# Patient Record
Sex: MALE | URBAN - METROPOLITAN AREA
[De-identification: names, ages, dates, MRNs, and addresses within clinical notes are randomized per-mention and may not be internally consistent; named-entity substitution may affect disease eponyms.]

---

## 2019-05-24 ENCOUNTER — HOSPITAL ENCOUNTER (OUTPATIENT)
Age: 49
Setting detail: SPECIMEN
Discharge: HOME | End: 2019-05-24
Payer: COMMERCIAL

## 2019-06-03 LAB — SURGICAL PATHOLOGY REPORT: NORMAL

## 2024-04-08 RX ORDER — SODIUM CHLORIDE, SODIUM LACTATE, POTASSIUM CHLORIDE, CALCIUM CHLORIDE 600; 310; 30; 20 MG/100ML; MG/100ML; MG/100ML; MG/100ML
INJECTION, SOLUTION INTRAVENOUS CONTINUOUS
OUTPATIENT
Start: 2024-04-08

## 2024-04-08 NOTE — DISCHARGE INSTRUCTIONS
Preoperative Instructions:    Stop eating solid foods at midnight the night prior to surgery.    Stop drinking clear liquids at midnight the night prior to surgery.    Arrive at the surgery center (Entrance B) by 10:00 on 4/26/2024  (or as directed by your surgeon's office).    Please stop any blood thinning medications as directed by your surgeon or prescribing physician. Failure to stop certain medications may interfere with your scheduled surgery.    These may include:  Aspirin, Warfarin (Coumadin), Clopidogrel (Plavix), Ibuprofen (Motrin, Advil), Naproxen (Aleve), Meloxicam (Mobic), Celecoxib (Celebrex), Eliquis, Pradaxa, Xarelto, Effient, Fish Oil, Herbal supplements.  ?mesalamine? (Will Check with surgeon)    You may continue the rest of your medications through the night before surgery unless instructed otherwise.    Please take only the following medication(s) the day of surgery with a small sip of water:  Gabapentin/neurontin    No lisinopril AM of surgery.    Please use and bring inhalers the day of surgery.  Please bring CPAP the day of surgery.    PLEASE NOTE:  THE ABOVE (IF ANY) DISCONTINUED MEDS MAY ONLY BE FROM   \"CLEANING UP\" THE MED LIST AND WERE NOT ACTUALLY CANCELLED; SEE CHART FOR DETAILS AND ALWAYS CHECK WITH PRESCRIBING PROVIDER BEFORE DISCONTINUING ANY MEDICATIONS          REMINDERS:  ** If you are going home the day of your procedure, you will need a friend or family member to drive you home after your procedure.  Your  must be 18 years of age or older and able to sign off on your discharge instructions.  Taxi cabs or any form of public transportation is not acceptable.    ** It is preferable that the friend or family member stay at the hospital throughout your procedure.  ** If you are going home the same day as your procedure, someone must remain with you for the first 24 hours after your surgery if you receive anesthesia or sedation.  If you do not have someone to stay with you, your  powder.    Dress with clean freshly washed clothes.    The morning of surgery:    Repeat shower following steps above  - using remaining half of CHG soap in bottle.     If you have any questions, call the Pre-Admission Testing Unit at [phone].     Day of Surgery/Procedure    As a patient at [facility] you can expect quality medical and nursing care that is centered on your individual needs.  Our goal is to make your surgical experience as comfortable as possible  .  Directions to the Surgery Center    [facility] is located at [address] [place] [address].  Please pull into the Emergency Room & Surgery Center parking lot (Entrance B) and park in that lot.  We also have additional parking across the street.  You will enter the facility following the [facility] sign.  Please stop at the reception desk where you will be checked in by the staff.  If you have any questions please call [phone].    Transportation after your procedure.    You will need a friend or family member to drive you home after your procedure.  Your  must be 18 years of age or older and able to sign off on your discharge instructions.  Taxi cabs or any form of public transportation is not acceptable.  It is preferable that the friend or family member stay at the hospital throughout your procedure.  Someone must remain at home with you for the first 24 hours after your surgery if you receive anesthesia or sedation.  If you do not have someone to stay with you, your procedure may be cancelled.    Patient Instructions    If you are having any type of anesthesia you are to have nothing to eat or drink after midnight the night before your surgery.  This includes gum, mints, water or smoking or chewing tobacco.  The only exception to this is a small sip of water to take with any morning dose of heart, blood pressure, or seizure medications.    Bring a list of all medications you

## 2024-04-11 ENCOUNTER — HOSPITAL ENCOUNTER (OUTPATIENT)
Dept: PREADMISSION TESTING | Age: 54
Discharge: HOME | End: 2024-04-11
Payer: COMMERCIAL

## 2024-04-11 VITALS
RESPIRATION RATE: 20 BRPM | HEART RATE: 60 BPM | TEMPERATURE: 97.3 F | SYSTOLIC BLOOD PRESSURE: 139 MMHG | HEIGHT: 76 IN | OXYGEN SATURATION: 95 % | BODY MASS INDEX: 34.7 KG/M2 | DIASTOLIC BLOOD PRESSURE: 86 MMHG | WEIGHT: 285 LBS

## 2024-04-11 LAB
ANION GAP SERPL CALCULATED.3IONS-SCNC: 10 MMOL/L (ref 9–16)
BUN SERPL-MCNC: 22 MG/DL (ref 6–20)
CHLORIDE SERPL-SCNC: 104 MMOL/L (ref 98–107)
CO2 SERPL-SCNC: 26 MMOL/L (ref 20–31)
CREAT SERPL-MCNC: 0.8 MG/DL (ref 0.7–1.2)
ERYTHROCYTE [DISTWIDTH] IN BLOOD BY AUTOMATED COUNT: 12 % (ref 11.8–14.4)
GFR SERPL CREATININE-BSD FRML MDRD: >90 ML/MIN/1.73M2
GLUCOSE SERPL-MCNC: 99 MG/DL (ref 74–99)
HCT VFR BLD AUTO: 43.4 % (ref 40.7–50.3)
HGB BLD-MCNC: 14.6 G/DL (ref 13–17)
MCH RBC QN AUTO: 30.9 PG (ref 25.2–33.5)
MCHC RBC AUTO-ENTMCNC: 33.6 G/DL (ref 28.4–34.8)
MCV RBC AUTO: 91.8 FL (ref 82.6–102.9)
NRBC BLD-RTO: 0 PER 100 WBC
PLATELET # BLD AUTO: 299 K/UL (ref 138–453)
PMV BLD AUTO: 9.9 FL (ref 8.1–13.5)
POTASSIUM SERPL-SCNC: 4.2 MMOL/L (ref 3.7–5.3)
RBC # BLD AUTO: 4.73 M/UL (ref 4.21–5.77)
SODIUM SERPL-SCNC: 140 MMOL/L (ref 136–145)
WBC OTHER # BLD: 10.1 K/UL (ref 3.5–11.3)

## 2024-04-11 PROCEDURE — 84520 ASSAY OF UREA NITROGEN: CPT

## 2024-04-11 PROCEDURE — 82947 ASSAY GLUCOSE BLOOD QUANT: CPT

## 2024-04-11 PROCEDURE — 85027 COMPLETE CBC AUTOMATED: CPT

## 2024-04-11 PROCEDURE — 82565 ASSAY OF CREATININE: CPT

## 2024-04-11 PROCEDURE — 80051 ELECTROLYTE PANEL: CPT

## 2024-04-11 PROCEDURE — 36415 COLL VENOUS BLD VENIPUNCTURE: CPT

## 2024-04-11 PROCEDURE — 93005 ELECTROCARDIOGRAM TRACING: CPT | Performed by: STUDENT IN AN ORGANIZED HEALTH CARE EDUCATION/TRAINING PROGRAM

## 2024-04-11 RX ORDER — ACETAMINOPHEN 500 MG
500 TABLET ORAL EVERY 6 HOURS PRN
COMMUNITY

## 2024-04-11 RX ORDER — MONTELUKAST SODIUM 10 MG/1
10 TABLET ORAL NIGHTLY
COMMUNITY
Start: 2017-03-19

## 2024-04-11 RX ORDER — LISINOPRIL 10 MG/1
10 TABLET ORAL DAILY
COMMUNITY
Start: 2019-08-08

## 2024-04-11 RX ORDER — IBUPROFEN 200 MG
200 TABLET ORAL EVERY 6 HOURS PRN
COMMUNITY

## 2024-04-11 RX ORDER — MESALAMINE 1.2 G/1
1200 TABLET, DELAYED RELEASE ORAL
COMMUNITY
Start: 2022-05-12

## 2024-04-11 RX ORDER — FLUTICASONE PROPIONATE 50 MCG
2 SPRAY, SUSPENSION (ML) NASAL DAILY
COMMUNITY
Start: 2017-07-07

## 2024-04-11 RX ORDER — ALBUTEROL SULFATE 90 UG/1
1 AEROSOL, METERED RESPIRATORY (INHALATION) PRN
COMMUNITY
Start: 2017-02-10

## 2024-04-11 NOTE — PROGRESS NOTES
Anesthesia Focused Assessment      STOP-BANG Sleep Apnea Questionnaire    SNORE loudly (heard through closed doors)?   yes  TIRED, fatigued, sleepy during daytime?    no  OBSERVED stopping breathing during sleep?   yes  High blood PRESSURE being treated?    yes    BMI over 35?        Yes  AGE over 50?        No  NECK circumference over 16\"?     Yes  GENDER (male)?       No             Total 5  High risk 5-8  Intermediate risk 3-4  Low risk 0-2    Obstructive Sleep Apnea: yes  If YES, machine used: cpap     Type 1 DM:   no  T2DM:  no    Coronary Artery Disease:  no  Hypertension:  yes    Active smoker:  1.5 ppd for years, quit 2016  Drinks alcohol:  weekly  Recreational drugs: no    Dentition: benign    Defib / AICD / Pacemaker: no      Renal Failure/dialysis:  no    Patient was evaluated in PAT & anesthesia guidelines were applied.   NPO guidelines, medication instructions and scheduled arrival time were reviewed with patient.  I advised patient to please contact the surgeon's office, ahead of time if possible, if any new signs or symptoms of illness, infection, rash, etc    Hx of anesthesia complications:  no  Family hx of anesthesia complications:  no                                                                                                                     Patient is active, good exercise tolerance, no cardiac/pulmonary complaints.  Will forward all results to PCP Dr. Smith.  Contacted Dr. Alfaro's office regarding mesalamine, Annie says patient does not need to stop this preoperatively, patient informed.    MICHELLE ARROYO PA-C  4/11/24  1:00 PM

## 2024-04-11 NOTE — H&P
History and Physical    Pt Name: Ventura Victoria  MRN: [id number]  YOB: 1970  Date of evaluation: 4/11/2024    SUBJECTIVE:   History of Chief Complaint:    Patient presents for PAT appointment. He reports a several year history of lower back pain, RLE symptoms.  He says that symptoms were more manageable up until the past year, when symptoms increased in severity.  Patient says that he has not been able to be as active as he had been in the past due to this.  He has failed conservative treatment over time.  Currently he has lower back pain, RLE pain with numbness/tingling.  Patient has been scheduled for ANTERIOR LUMBAR INTERBODY FUSION L4-5, L5-S1.   Past Medical History    has a past medical history of Asthma, exercise induced, Former smoker, Hyperlipidemia, Hypertension, Sleep apnea, Ulcerative colitis (HCC), Under care of team, Under care of team, Wears eyeglasses, and Wellness examination.  Past Surgical History   has a past surgical history that includes nasal endoscopy (Bilateral, 2017); ORIF ankle fracture (Left, 1986); Knee cartilage surgery (Right, 2010); tumor removal; and Colonoscopy.  Medications  Prior to Admission medications    Medication Sig Start Date End Date Taking? Authorizing Provider   lisinopril (PRINIVIL;ZESTRIL) 10 MG tablet Take 1 tablet by mouth daily 8/8/19  Yes ProviderShereen MD   mesalamine (LIALDA) 1.2 g EC tablet Take 1 tablet by mouth daily (with breakfast)   Takes 4 tabs q am w/ breakfast 5/12/22  Yes ProviderShereen MD   montelukast (SINGULAIR) 10 MG tablet Take 1 tablet by mouth nightly 3/19/17  Yes Provider, MD Shereen   Misc Natural Products (TURMERIC CURCUMIN) CAPS Take by mouth Doesn't know dose. Takes 2 caps q am and 1 cap q HS   Yes ProviderShereen MD   acetaminophen (TYLENOL) 500 MG tablet Take 1 tablet by mouth every 6 hours as needed for Pain Takes 2 tab at 9 am and 4 pm   Yes ProviderShereen MD   ibuprofen (ADVIL;MOTRIN) 200 MG tablet  Take 1 tablet by mouth every 6 hours as needed for Pain Takes 2 tabs at 0430, 1200 and HS   Yes Shereen Mina MD   albuterol sulfate HFA (PROAIR HFA) 108 (90 Base) MCG/ACT inhaler Inhale 1 puff into the lungs as needed Last reported use 4/9/2024 for sob after walking 2/10/17  Yes Shereen Mina MD   fluticasone (FLONASE) 50 MCG/ACT nasal spray 2 sprays by Nasal route daily 7/7/17  Yes Shereen Mina MD   ROSUVASTATIN CALCIUM PO Take 10 mg by mouth daily (with breakfast)    Shereen Mina MD   GABAPENTIN PO Take 300 mg by mouth nightly    Shereen Mina MD     Allergies  is allergic to cats claw (uncaria tomentosa).  Family History  family history includes Breast Cancer in his mother; Heart Attack in his father; Heart Disease in his father.  Social History   reports that he has quit smoking. His smoking use included cigarettes. He has never used smokeless tobacco.   reports current alcohol use.   reports no history of drug use.  Marital Status has a long time girlfriend    Review of Systems:  CONSTITUTIONAL:   negative for fevers, chills, fatigue and malaise    EYES:   negative for double vision, blurred vision and photophobia    HEENT:   negative for tinnitus, epistaxis and sore throat     RESPIRATORY:   negative for cough, shortness of breath, wheezing     CARDIOVASCULAR:   negative for chest pain, palpitations, syncope, edema     GASTROINTESTINAL:   negative for nausea, vomiting     GENITOURINARY/RENAL:   negative for incontinence     MUSCULOSKELETAL:   See HPI   NEUROLOGICAL:   See HPI    PSYCHIATRIC:   negative for anxiety        OBJECTIVE:   VITALS:  height is 1.93 m (6' 4\") and weight is 129.3 kg (285 lb). His temporal temperature is 97.3 °F (36.3 °C). His blood pressure is 139/86 and his pulse is 60. His respiration is 20 and oxygen saturation is 95%.   CONSTITUTIONAL:alert & cooperative, no acute distress.  Very pleasant, talkative.  SKIN:  Brief inspection of skin, Warm

## 2024-04-12 LAB
EKG ATRIAL RATE: 58 BPM
EKG P AXIS: 37 DEGREES
EKG P-R INTERVAL: 182 MS
EKG Q-T INTERVAL: 406 MS
EKG QRS DURATION: 94 MS
EKG QTC CALCULATION (BAZETT): 398 MS
EKG R AXIS: 61 DEGREES
EKG T AXIS: 47 DEGREES
EKG VENTRICULAR RATE: 58 BPM

## 2024-04-12 PROCEDURE — 93010 ELECTROCARDIOGRAM REPORT: CPT | Performed by: INTERNAL MEDICINE

## 2024-04-26 ENCOUNTER — HOSPITAL ENCOUNTER (INPATIENT)
Age: 54
LOS: 2 days | Discharge: HOME | DRG: 460 | End: 2024-04-28
Attending: STUDENT IN AN ORGANIZED HEALTH CARE EDUCATION/TRAINING PROGRAM | Admitting: STUDENT IN AN ORGANIZED HEALTH CARE EDUCATION/TRAINING PROGRAM
Payer: COMMERCIAL

## 2024-04-26 ENCOUNTER — ANESTHESIA (OUTPATIENT)
Dept: OPERATING ROOM | Age: 54
End: 2024-04-26
Payer: COMMERCIAL

## 2024-04-26 ENCOUNTER — APPOINTMENT (OUTPATIENT)
Dept: GENERAL RADIOLOGY | Age: 54
DRG: 460 | End: 2024-04-26
Attending: STUDENT IN AN ORGANIZED HEALTH CARE EDUCATION/TRAINING PROGRAM
Payer: COMMERCIAL

## 2024-04-26 ENCOUNTER — ANESTHESIA EVENT (OUTPATIENT)
Dept: OPERATING ROOM | Age: 54
End: 2024-04-26
Payer: COMMERCIAL

## 2024-04-26 ENCOUNTER — HOSPITAL ENCOUNTER (OUTPATIENT)
Dept: CT IMAGING | Age: 54
Discharge: HOME | DRG: 460 | End: 2024-04-28
Attending: ORTHOPAEDIC SURGERY
Payer: COMMERCIAL

## 2024-04-26 DIAGNOSIS — G89.18 POST-OP PAIN: Primary | ICD-10-CM

## 2024-04-26 DIAGNOSIS — M48.062 PSEUDOCLAUDICATION SYNDROME: ICD-10-CM

## 2024-04-26 PROBLEM — Z98.890 STATUS POST LUMBAR SPINE OPERATION: Status: ACTIVE | Noted: 2024-04-26

## 2024-04-26 LAB
ABO + RH BLD: NORMAL
ARM BAND NUMBER: NORMAL
BASOPHILS # BLD: 0.08 K/UL (ref 0–0.2)
BASOPHILS NFR BLD: 1 % (ref 0–2)
BLOOD BANK SAMPLE EXPIRATION: NORMAL
BLOOD GROUP ANTIBODIES SERPL: NEGATIVE
EOSINOPHIL # BLD: 0.71 K/UL (ref 0–0.44)
EOSINOPHILS RELATIVE PERCENT: 6 % (ref 1–4)
ERYTHROCYTE [DISTWIDTH] IN BLOOD BY AUTOMATED COUNT: 11.8 % (ref 11.8–14.4)
HCT VFR BLD AUTO: 44.7 % (ref 40.7–50.3)
HCT VFR BLD AUTO: 50 % (ref 41–53)
HGB BLD-MCNC: 15.3 G/DL (ref 13–17)
IMM GRANULOCYTES # BLD AUTO: 0.11 K/UL (ref 0–0.3)
IMM GRANULOCYTES NFR BLD: 1 %
LYMPHOCYTES NFR BLD: 1.12 K/UL (ref 1.1–3.7)
LYMPHOCYTES RELATIVE PERCENT: 9 % (ref 24–43)
MCH RBC QN AUTO: 30.7 PG (ref 25.2–33.5)
MCHC RBC AUTO-ENTMCNC: 34.2 G/DL (ref 28.4–34.8)
MCV RBC AUTO: 89.6 FL (ref 82.6–102.9)
MONOCYTES NFR BLD: 1.37 K/UL (ref 0.1–1.2)
MONOCYTES NFR BLD: 11 % (ref 3–12)
NEUTROPHILS NFR BLD: 72 % (ref 36–65)
NEUTS SEG NFR BLD: 8.62 K/UL (ref 1.5–8.1)
NRBC BLD-RTO: 0 PER 100 WBC
PLATELET # BLD AUTO: 374 K/UL (ref 138–453)
PMV BLD AUTO: 9.1 FL (ref 8.1–13.5)
POC HEMOGLOBIN (CALC): 17 G/DL (ref 13.5–17.5)
RBC # BLD AUTO: 4.99 M/UL (ref 4.21–5.77)
WBC OTHER # BLD: 12 K/UL (ref 3.5–11.3)

## 2024-04-26 PROCEDURE — 85025 COMPLETE CBC W/AUTO DIFF WBC: CPT

## 2024-04-26 PROCEDURE — C1889 IMPLANT/INSERT DEVICE, NOC: HCPCS | Performed by: STUDENT IN AN ORGANIZED HEALTH CARE EDUCATION/TRAINING PROGRAM

## 2024-04-26 PROCEDURE — 7100000000 HC PACU RECOVERY - FIRST 15 MIN: Performed by: STUDENT IN AN ORGANIZED HEALTH CARE EDUCATION/TRAINING PROGRAM

## 2024-04-26 PROCEDURE — 86900 BLOOD TYPING SEROLOGIC ABO: CPT

## 2024-04-26 PROCEDURE — 2500000003 HC RX 250 WO HCPCS: Performed by: STUDENT IN AN ORGANIZED HEALTH CARE EDUCATION/TRAINING PROGRAM

## 2024-04-26 PROCEDURE — 1200000000 HC SEMI PRIVATE

## 2024-04-26 PROCEDURE — 0SG00A0 FUSION OF LUMBAR VERTEBRAL JOINT WITH INTERBODY FUSION DEVICE, ANTERIOR APPROACH, ANTERIOR COLUMN, OPEN APPROACH: ICD-10-PCS | Performed by: STUDENT IN AN ORGANIZED HEALTH CARE EDUCATION/TRAINING PROGRAM

## 2024-04-26 PROCEDURE — 85014 HEMATOCRIT: CPT

## 2024-04-26 PROCEDURE — 3700000000 HC ANESTHESIA ATTENDED CARE: Performed by: STUDENT IN AN ORGANIZED HEALTH CARE EDUCATION/TRAINING PROGRAM

## 2024-04-26 PROCEDURE — 2580000003 HC RX 258: Performed by: STUDENT IN AN ORGANIZED HEALTH CARE EDUCATION/TRAINING PROGRAM

## 2024-04-26 PROCEDURE — 6360000002 HC RX W HCPCS: Performed by: ANESTHESIOLOGY

## 2024-04-26 PROCEDURE — 72131 CT LUMBAR SPINE W/O DYE: CPT

## 2024-04-26 PROCEDURE — 2580000003 HC RX 258

## 2024-04-26 PROCEDURE — 3600000006 HC SURGERY LEVEL 6 BASE: Performed by: STUDENT IN AN ORGANIZED HEALTH CARE EDUCATION/TRAINING PROGRAM

## 2024-04-26 PROCEDURE — 6360000002 HC RX W HCPCS: Performed by: STUDENT IN AN ORGANIZED HEALTH CARE EDUCATION/TRAINING PROGRAM

## 2024-04-26 PROCEDURE — 0ST20ZZ RESECTION OF LUMBAR VERTEBRAL DISC, OPEN APPROACH: ICD-10-PCS | Performed by: STUDENT IN AN ORGANIZED HEALTH CARE EDUCATION/TRAINING PROGRAM

## 2024-04-26 PROCEDURE — 3600000016 HC SURGERY LEVEL 6 ADDTL 15MIN: Performed by: STUDENT IN AN ORGANIZED HEALTH CARE EDUCATION/TRAINING PROGRAM

## 2024-04-26 PROCEDURE — 86850 RBC ANTIBODY SCREEN: CPT

## 2024-04-26 PROCEDURE — 3E0U0GB INTRODUCTION OF RECOMBINANT BONE MORPHOGENETIC PROTEIN INTO JOINTS, OPEN APPROACH: ICD-10-PCS | Performed by: STUDENT IN AN ORGANIZED HEALTH CARE EDUCATION/TRAINING PROGRAM

## 2024-04-26 PROCEDURE — 2720000010 HC SURG SUPPLY STERILE: Performed by: STUDENT IN AN ORGANIZED HEALTH CARE EDUCATION/TRAINING PROGRAM

## 2024-04-26 PROCEDURE — 2060000002 HC BURN ICU INTERMEDIATE R&B

## 2024-04-26 PROCEDURE — 2500000003 HC RX 250 WO HCPCS: Performed by: SURGERY

## 2024-04-26 PROCEDURE — 6370000000 HC RX 637 (ALT 250 FOR IP)

## 2024-04-26 PROCEDURE — 86901 BLOOD TYPING SEROLOGIC RH(D): CPT

## 2024-04-26 PROCEDURE — 7100000001 HC PACU RECOVERY - ADDTL 15 MIN: Performed by: STUDENT IN AN ORGANIZED HEALTH CARE EDUCATION/TRAINING PROGRAM

## 2024-04-26 PROCEDURE — 6370000000 HC RX 637 (ALT 250 FOR IP): Performed by: STUDENT IN AN ORGANIZED HEALTH CARE EDUCATION/TRAINING PROGRAM

## 2024-04-26 PROCEDURE — 0SG30A0 FUSION OF LUMBOSACRAL JOINT WITH INTERBODY FUSION DEVICE, ANTERIOR APPROACH, ANTERIOR COLUMN, OPEN APPROACH: ICD-10-PCS | Performed by: STUDENT IN AN ORGANIZED HEALTH CARE EDUCATION/TRAINING PROGRAM

## 2024-04-26 PROCEDURE — 0ST40ZZ RESECTION OF LUMBOSACRAL DISC, OPEN APPROACH: ICD-10-PCS | Performed by: STUDENT IN AN ORGANIZED HEALTH CARE EDUCATION/TRAINING PROGRAM

## 2024-04-26 PROCEDURE — 8E0W3CZ ROBOTIC ASSISTED PROCEDURE OF TRUNK REGION, PERCUTANEOUS APPROACH: ICD-10-PCS | Performed by: STUDENT IN AN ORGANIZED HEALTH CARE EDUCATION/TRAINING PROGRAM

## 2024-04-26 PROCEDURE — 2500000003 HC RX 250 WO HCPCS

## 2024-04-26 PROCEDURE — 2709999900 HC NON-CHARGEABLE SUPPLY: Performed by: STUDENT IN AN ORGANIZED HEALTH CARE EDUCATION/TRAINING PROGRAM

## 2024-04-26 PROCEDURE — 36415 COLL VENOUS BLD VENIPUNCTURE: CPT

## 2024-04-26 PROCEDURE — 6360000002 HC RX W HCPCS

## 2024-04-26 PROCEDURE — C1713 ANCHOR/SCREW BN/BN,TIS/BN: HCPCS | Performed by: STUDENT IN AN ORGANIZED HEALTH CARE EDUCATION/TRAINING PROGRAM

## 2024-04-26 PROCEDURE — 3700000001 HC ADD 15 MINUTES (ANESTHESIA): Performed by: STUDENT IN AN ORGANIZED HEALTH CARE EDUCATION/TRAINING PROGRAM

## 2024-04-26 DEVICE — BONE SCREW 4675030 LS 5.0X30MM
Type: IMPLANTABLE DEVICE | Site: SPINE LUMBAR | Status: FUNCTIONAL
Brand: ANTERALIGN SPINAL SYSTEM WITH TITAN NANOLOCK SURFACE TECHNOLOGY

## 2024-04-26 DEVICE — SET SCR SPNL DIA4.75MM POST THORLUM SACR TI PERC APPRCH CDH: Type: IMPLANTABLE DEVICE | Site: SPINE LUMBAR | Status: FUNCTIONAL

## 2024-04-26 DEVICE — MAS 54850017555 4.75 EXT TAB CANN 7.5X55
Type: IMPLANTABLE DEVICE | Site: SPINE LUMBAR | Status: FUNCTIONAL
Brand: CD HORIZON® SOLERA® SPINAL SYSTEM

## 2024-04-26 DEVICE — BONE GRAFT KIT 7510100 INFUSE X SMALL
Type: IMPLANTABLE DEVICE | Site: SPINE LUMBAR | Status: FUNCTIONAL
Brand: INFUSE® BONE GRAFT

## 2024-04-26 DEVICE — DBM T43105 5CC GRAFTON PUTTY
Type: IMPLANTABLE DEVICE | Site: SPINE LUMBAR | Status: FUNCTIONAL
Brand: GRAFTON®AND GRAFTON PLUS®DEMINERALIZED BONE MATRIX (DBM)

## 2024-04-26 RX ORDER — MAGNESIUM HYDROXIDE 1200 MG/15ML
LIQUID ORAL CONTINUOUS PRN
Status: COMPLETED | OUTPATIENT
Start: 2024-04-26 | End: 2024-04-26

## 2024-04-26 RX ORDER — SODIUM CHLORIDE 0.9 % (FLUSH) 0.9 %
5-40 SYRINGE (ML) INJECTION PRN
Status: DISCONTINUED | OUTPATIENT
Start: 2024-04-26 | End: 2024-04-26 | Stop reason: HOSPADM

## 2024-04-26 RX ORDER — DEXAMETHASONE SODIUM PHOSPHATE 10 MG/ML
INJECTION INTRAMUSCULAR; INTRAVENOUS PRN
Status: DISCONTINUED | OUTPATIENT
Start: 2024-04-26 | End: 2024-04-26 | Stop reason: SDUPTHER

## 2024-04-26 RX ORDER — LISINOPRIL 10 MG/1
10 TABLET ORAL DAILY
Status: DISCONTINUED | OUTPATIENT
Start: 2024-04-26 | End: 2024-04-26

## 2024-04-26 RX ORDER — OXYCODONE HYDROCHLORIDE 5 MG/1
5 TABLET ORAL EVERY 4 HOURS PRN
Status: DISCONTINUED | OUTPATIENT
Start: 2024-04-26 | End: 2024-04-28 | Stop reason: HOSPADM

## 2024-04-26 RX ORDER — OXYCODONE HYDROCHLORIDE 5 MG/1
5 TABLET ORAL EVERY 6 HOURS PRN
Qty: 28 TABLET | Refills: 0 | Status: SHIPPED | OUTPATIENT
Start: 2024-04-26 | End: 2024-05-03

## 2024-04-26 RX ORDER — SODIUM CHLORIDE 9 MG/ML
INJECTION, SOLUTION INTRAVENOUS PRN
Status: DISCONTINUED | OUTPATIENT
Start: 2024-04-26 | End: 2024-04-26 | Stop reason: HOSPADM

## 2024-04-26 RX ORDER — ONDANSETRON 2 MG/ML
INJECTION INTRAMUSCULAR; INTRAVENOUS PRN
Status: DISCONTINUED | OUTPATIENT
Start: 2024-04-26 | End: 2024-04-26 | Stop reason: SDUPTHER

## 2024-04-26 RX ORDER — SODIUM CHLORIDE, SODIUM LACTATE, POTASSIUM CHLORIDE, CALCIUM CHLORIDE 600; 310; 30; 20 MG/100ML; MG/100ML; MG/100ML; MG/100ML
INJECTION, SOLUTION INTRAVENOUS CONTINUOUS PRN
Status: DISCONTINUED | OUTPATIENT
Start: 2024-04-26 | End: 2024-04-26 | Stop reason: SDUPTHER

## 2024-04-26 RX ORDER — ONDANSETRON 2 MG/ML
4 INJECTION INTRAMUSCULAR; INTRAVENOUS EVERY 6 HOURS PRN
Status: DISCONTINUED | OUTPATIENT
Start: 2024-04-26 | End: 2024-04-28 | Stop reason: HOSPADM

## 2024-04-26 RX ORDER — CYCLOBENZAPRINE HCL 10 MG
10 TABLET ORAL 3 TIMES DAILY PRN
Status: DISCONTINUED | OUTPATIENT
Start: 2024-04-26 | End: 2024-04-28 | Stop reason: HOSPADM

## 2024-04-26 RX ORDER — MONTELUKAST SODIUM 10 MG/1
10 TABLET ORAL NIGHTLY
Status: DISCONTINUED | OUTPATIENT
Start: 2024-04-26 | End: 2024-04-28 | Stop reason: HOSPADM

## 2024-04-26 RX ORDER — SENNA AND DOCUSATE SODIUM 50; 8.6 MG/1; MG/1
1 TABLET, FILM COATED ORAL 2 TIMES DAILY
Status: DISCONTINUED | OUTPATIENT
Start: 2024-04-26 | End: 2024-04-28 | Stop reason: HOSPADM

## 2024-04-26 RX ORDER — DOCUSATE SODIUM 100 MG/1
100 CAPSULE, LIQUID FILLED ORAL 2 TIMES DAILY
Qty: 20 CAPSULE | Refills: 1 | Status: SHIPPED | OUTPATIENT
Start: 2024-04-26

## 2024-04-26 RX ORDER — PROPOFOL 10 MG/ML
INJECTION, EMULSION INTRAVENOUS PRN
Status: DISCONTINUED | OUTPATIENT
Start: 2024-04-26 | End: 2024-04-26 | Stop reason: SDUPTHER

## 2024-04-26 RX ORDER — ROSUVASTATIN CALCIUM 10 MG/1
10 TABLET, COATED ORAL
Status: DISCONTINUED | OUTPATIENT
Start: 2024-04-27 | End: 2024-04-28 | Stop reason: HOSPADM

## 2024-04-26 RX ORDER — MESALAMINE 400 MG/1
400 CAPSULE, DELAYED RELEASE ORAL 3 TIMES DAILY
Status: DISCONTINUED | OUTPATIENT
Start: 2024-04-27 | End: 2024-04-28 | Stop reason: HOSPADM

## 2024-04-26 RX ORDER — ACETAMINOPHEN 325 MG/1
650 TABLET ORAL EVERY 6 HOURS
Status: DISCONTINUED | OUTPATIENT
Start: 2024-04-26 | End: 2024-04-28 | Stop reason: HOSPADM

## 2024-04-26 RX ORDER — LISINOPRIL 10 MG/1
10 TABLET ORAL DAILY
Status: DISCONTINUED | OUTPATIENT
Start: 2024-04-27 | End: 2024-04-28 | Stop reason: HOSPADM

## 2024-04-26 RX ORDER — LIDOCAINE HYDROCHLORIDE 10 MG/ML
INJECTION, SOLUTION EPIDURAL; INFILTRATION; INTRACAUDAL; PERINEURAL PRN
Status: DISCONTINUED | OUTPATIENT
Start: 2024-04-26 | End: 2024-04-26 | Stop reason: SDUPTHER

## 2024-04-26 RX ORDER — ALBUTEROL SULFATE 90 UG/1
1 AEROSOL, METERED RESPIRATORY (INHALATION) PRN
Status: DISCONTINUED | OUTPATIENT
Start: 2024-04-26 | End: 2024-04-28 | Stop reason: HOSPADM

## 2024-04-26 RX ORDER — FLUTICASONE PROPIONATE 50 MCG
2 SPRAY, SUSPENSION (ML) NASAL DAILY
Status: DISCONTINUED | OUTPATIENT
Start: 2024-04-26 | End: 2024-04-28 | Stop reason: HOSPADM

## 2024-04-26 RX ORDER — SODIUM CHLORIDE 9 MG/ML
INJECTION, SOLUTION INTRAVENOUS CONTINUOUS
Status: DISCONTINUED | OUTPATIENT
Start: 2024-04-26 | End: 2024-04-28

## 2024-04-26 RX ORDER — SODIUM CHLORIDE 9 MG/ML
INJECTION, SOLUTION INTRAVENOUS CONTINUOUS PRN
Status: DISCONTINUED | OUTPATIENT
Start: 2024-04-26 | End: 2024-04-26 | Stop reason: SDUPTHER

## 2024-04-26 RX ORDER — NALOXONE HYDROCHLORIDE 0.4 MG/ML
INJECTION, SOLUTION INTRAMUSCULAR; INTRAVENOUS; SUBCUTANEOUS PRN
Status: DISCONTINUED | OUTPATIENT
Start: 2024-04-26 | End: 2024-04-26 | Stop reason: HOSPADM

## 2024-04-26 RX ORDER — FENTANYL CITRATE 50 UG/ML
INJECTION, SOLUTION INTRAMUSCULAR; INTRAVENOUS PRN
Status: DISCONTINUED | OUTPATIENT
Start: 2024-04-26 | End: 2024-04-26 | Stop reason: SDUPTHER

## 2024-04-26 RX ORDER — SODIUM CHLORIDE 0.9 % (FLUSH) 0.9 %
5-40 SYRINGE (ML) INJECTION EVERY 12 HOURS SCHEDULED
Status: DISCONTINUED | OUTPATIENT
Start: 2024-04-26 | End: 2024-04-28 | Stop reason: HOSPADM

## 2024-04-26 RX ORDER — SODIUM CHLORIDE, SODIUM LACTATE, POTASSIUM CHLORIDE, CALCIUM CHLORIDE 600; 310; 30; 20 MG/100ML; MG/100ML; MG/100ML; MG/100ML
INJECTION, SOLUTION INTRAVENOUS CONTINUOUS
Status: DISCONTINUED | OUTPATIENT
Start: 2024-04-26 | End: 2024-04-26 | Stop reason: HOSPADM

## 2024-04-26 RX ORDER — HEPARIN SODIUM 10000 [USP'U]/ML
INJECTION, SOLUTION INTRAVENOUS; SUBCUTANEOUS PRN
Status: DISCONTINUED | OUTPATIENT
Start: 2024-04-26 | End: 2024-04-26 | Stop reason: ALTCHOICE

## 2024-04-26 RX ORDER — MIDAZOLAM HYDROCHLORIDE 1 MG/ML
INJECTION INTRAMUSCULAR; INTRAVENOUS PRN
Status: DISCONTINUED | OUTPATIENT
Start: 2024-04-26 | End: 2024-04-26 | Stop reason: SDUPTHER

## 2024-04-26 RX ORDER — SODIUM CHLORIDE 0.9 % (FLUSH) 0.9 %
5-40 SYRINGE (ML) INJECTION PRN
Status: DISCONTINUED | OUTPATIENT
Start: 2024-04-26 | End: 2024-04-28 | Stop reason: HOSPADM

## 2024-04-26 RX ORDER — KETAMINE HYDROCHLORIDE 100 MG/ML
INJECTION, SOLUTION INTRAMUSCULAR; INTRAVENOUS PRN
Status: DISCONTINUED | OUTPATIENT
Start: 2024-04-26 | End: 2024-04-26 | Stop reason: SDUPTHER

## 2024-04-26 RX ORDER — ROCURONIUM BROMIDE 10 MG/ML
INJECTION, SOLUTION INTRAVENOUS PRN
Status: DISCONTINUED | OUTPATIENT
Start: 2024-04-26 | End: 2024-04-26 | Stop reason: SDUPTHER

## 2024-04-26 RX ORDER — CYCLOBENZAPRINE HCL 10 MG
10 TABLET ORAL
Qty: 50 TABLET | Refills: 0 | Status: SHIPPED | OUTPATIENT
Start: 2024-04-26

## 2024-04-26 RX ORDER — SODIUM CHLORIDE 0.9 % (FLUSH) 0.9 %
5-40 SYRINGE (ML) INJECTION EVERY 12 HOURS SCHEDULED
Status: DISCONTINUED | OUTPATIENT
Start: 2024-04-26 | End: 2024-04-26 | Stop reason: HOSPADM

## 2024-04-26 RX ORDER — SODIUM CHLORIDE 9 MG/ML
INJECTION, SOLUTION INTRAVENOUS PRN
Status: DISCONTINUED | OUTPATIENT
Start: 2024-04-26 | End: 2024-04-28 | Stop reason: HOSPADM

## 2024-04-26 RX ORDER — MESALAMINE 400 MG/1
1600 CAPSULE, DELAYED RELEASE ORAL 3 TIMES DAILY
Status: DISCONTINUED | OUTPATIENT
Start: 2024-04-26 | End: 2024-04-26

## 2024-04-26 RX ORDER — OXYCODONE HYDROCHLORIDE 5 MG/1
10 TABLET ORAL EVERY 4 HOURS PRN
Status: DISCONTINUED | OUTPATIENT
Start: 2024-04-26 | End: 2024-04-28 | Stop reason: HOSPADM

## 2024-04-26 RX ADMIN — DEXAMETHASONE SODIUM PHOSPHATE 10 MG: 10 INJECTION INTRAMUSCULAR; INTRAVENOUS at 15:00

## 2024-04-26 RX ADMIN — ACETAMINOPHEN 650 MG: 325 TABLET ORAL at 19:54

## 2024-04-26 RX ADMIN — MIDAZOLAM 2 MG: 1 INJECTION INTRAMUSCULAR; INTRAVENOUS at 13:28

## 2024-04-26 RX ADMIN — OXYCODONE 10 MG: 5 TABLET ORAL at 19:54

## 2024-04-26 RX ADMIN — SUGAMMADEX 400 MG: 100 INJECTION, SOLUTION INTRAVENOUS at 18:47

## 2024-04-26 RX ADMIN — ONDANSETRON 4 MG: 2 INJECTION INTRAMUSCULAR; INTRAVENOUS at 15:00

## 2024-04-26 RX ADMIN — Medication 2000 MG: at 17:35

## 2024-04-26 RX ADMIN — SODIUM CHLORIDE, SODIUM LACTATE, POTASSIUM CHLORIDE, CALCIUM CHLORIDE: 600; 310; 30; 20 INJECTION, SOLUTION INTRAVENOUS at 17:03

## 2024-04-26 RX ADMIN — MONTELUKAST SODIUM 10 MG: 10 TABLET, FILM COATED ORAL at 21:24

## 2024-04-26 RX ADMIN — SODIUM CHLORIDE: 9 INJECTION, SOLUTION INTRAVENOUS at 20:22

## 2024-04-26 RX ADMIN — SODIUM CHLORIDE: 9 INJECTION, SOLUTION INTRAVENOUS at 15:00

## 2024-04-26 RX ADMIN — HYDROMORPHONE HYDROCHLORIDE 0.5 MG: 1 INJECTION, SOLUTION INTRAMUSCULAR; INTRAVENOUS; SUBCUTANEOUS at 19:04

## 2024-04-26 RX ADMIN — FENTANYL CITRATE 100 MCG: 50 INJECTION, SOLUTION INTRAMUSCULAR; INTRAVENOUS at 13:45

## 2024-04-26 RX ADMIN — SODIUM CHLORIDE, POTASSIUM CHLORIDE, SODIUM LACTATE AND CALCIUM CHLORIDE: 600; 310; 30; 20 INJECTION, SOLUTION INTRAVENOUS at 13:15

## 2024-04-26 RX ADMIN — FENTANYL CITRATE 50 MCG: 50 INJECTION, SOLUTION INTRAMUSCULAR; INTRAVENOUS at 18:09

## 2024-04-26 RX ADMIN — FENTANYL CITRATE 50 MCG: 50 INJECTION, SOLUTION INTRAMUSCULAR; INTRAVENOUS at 17:42

## 2024-04-26 RX ADMIN — LIDOCAINE HYDROCHLORIDE 50 MG: 10 INJECTION, SOLUTION EPIDURAL; INFILTRATION; INTRACAUDAL; PERINEURAL at 13:28

## 2024-04-26 RX ADMIN — ROCURONIUM BROMIDE 20 MG: 10 INJECTION INTRAVENOUS at 15:29

## 2024-04-26 RX ADMIN — FENTANYL CITRATE 100 MCG: 50 INJECTION, SOLUTION INTRAMUSCULAR; INTRAVENOUS at 18:24

## 2024-04-26 RX ADMIN — KETAMINE HYDROCHLORIDE 20 MG: 100 INJECTION, SOLUTION, CONCENTRATE INTRAMUSCULAR; INTRAVENOUS at 15:29

## 2024-04-26 RX ADMIN — FENTANYL CITRATE 100 MCG: 50 INJECTION, SOLUTION INTRAMUSCULAR; INTRAVENOUS at 13:28

## 2024-04-26 RX ADMIN — FENTANYL CITRATE 50 MCG: 50 INJECTION, SOLUTION INTRAMUSCULAR; INTRAVENOUS at 15:29

## 2024-04-26 RX ADMIN — ROCURONIUM BROMIDE 50 MG: 10 INJECTION INTRAVENOUS at 14:38

## 2024-04-26 RX ADMIN — HYDROMORPHONE HYDROCHLORIDE 0.5 MG: 1 INJECTION, SOLUTION INTRAMUSCULAR; INTRAVENOUS; SUBCUTANEOUS at 19:15

## 2024-04-26 RX ADMIN — KETAMINE HYDROCHLORIDE 20 MG: 100 INJECTION, SOLUTION, CONCENTRATE INTRAMUSCULAR; INTRAVENOUS at 15:00

## 2024-04-26 RX ADMIN — SODIUM CHLORIDE, PRESERVATIVE FREE 10 ML: 5 INJECTION INTRAVENOUS at 20:23

## 2024-04-26 RX ADMIN — PROPOFOL 200 MG: 10 INJECTION, EMULSION INTRAVENOUS at 13:28

## 2024-04-26 RX ADMIN — ROCURONIUM BROMIDE 50 MG: 10 INJECTION INTRAVENOUS at 13:28

## 2024-04-26 RX ADMIN — Medication 3000 MG: at 13:40

## 2024-04-26 RX ADMIN — ROCURONIUM BROMIDE 10 MG: 10 INJECTION INTRAVENOUS at 17:45

## 2024-04-26 RX ADMIN — ROCURONIUM BROMIDE 50 MG: 10 INJECTION INTRAVENOUS at 13:45

## 2024-04-26 RX ADMIN — KETAMINE HYDROCHLORIDE 10 MG: 100 INJECTION, SOLUTION, CONCENTRATE INTRAMUSCULAR; INTRAVENOUS at 14:09

## 2024-04-26 ASSESSMENT — PAIN DESCRIPTION - ONSET: ONSET: ON-GOING

## 2024-04-26 ASSESSMENT — PAIN SCALES - GENERAL
PAINLEVEL_OUTOF10: 7
PAINLEVEL_OUTOF10: 8
PAINLEVEL_OUTOF10: 8

## 2024-04-26 ASSESSMENT — PAIN DESCRIPTION - LOCATION: LOCATION: BACK

## 2024-04-26 ASSESSMENT — PAIN DESCRIPTION - PAIN TYPE: TYPE: SURGICAL PAIN

## 2024-04-26 ASSESSMENT — PAIN - FUNCTIONAL ASSESSMENT: PAIN_FUNCTIONAL_ASSESSMENT: ACTIVITIES ARE NOT PREVENTED

## 2024-04-26 ASSESSMENT — PAIN DESCRIPTION - ORIENTATION: ORIENTATION: MID

## 2024-04-26 ASSESSMENT — PAIN DESCRIPTION - DESCRIPTORS: DESCRIPTORS: SORE

## 2024-04-26 ASSESSMENT — PAIN DESCRIPTION - FREQUENCY: FREQUENCY: CONTINUOUS

## 2024-04-26 NOTE — BRIEF OP NOTE
5X30 MM TITAN NANOLOCK STRL ANTERALIGN LTX - [id number]  SCREW SPNL LCK 5X30 MM TITAN NANOLOCK STRL ANTERALIGN LTX  [employer] SOFAMOR DANEK-WD [id number] N/A 1 Implanted   SCREW SPNL LCK 5X30 MM TITAN NANOLOCK STRL ANTERALIGN LTX - [id number]  SCREW SPNL LCK 5X30 MM TITAN NANOLOCK STRL ANTERALIGN LTX  [employer] SOFAMOR DANEK-WD [id number] N/A 1 Implanted   [employer] ANTERALIGN SPINAL SYSTEM REF: [id number]     [id number] N/A 1 Implanted   SCREW SPNL LCK 5X20 MM TITAN NANOLOCK STRL ANTERALIGN LTX - [id number]  SCREW SPNL LCK 5X20 MM TITAN NANOLOCK STRL ANTERALIGN LTX  [employer] SOFAMOR DANEK-WD  N/A 1 Implanted   SCREW SPNL LCK 5X30 MM TITAN NANOLOCK STRL ANTERALIGN LTX - [id number]  SCREW SPNL LCK 5X30 MM TITAN NANOLOCK STRL ANTERALIGN LTX  [employer] SOFAMOR DANEK-WD  N/A 4 Implanted   SCREW SPNL L55MM OD65MM POST THORACOLUMBOSACRAL RAJESH - [id number]  SCREW SPNL L55MM OD65MM POST THORACOLUMBOSACRAL RAJESH  [employer] SOFAMOR DANEK-WD  N/A 3 Implanted   SCREW SPNL L55MM OD75MM POST THORACOLUMBOSACRAL RAJESH - [id number]  SCREW SPNL L55MM OD75MM POST THORACOLUMBOSACRAL RAJESH  [employer] SOFAMOR DANEK-WD  N/A 2 Implanted   SCREW SPNL RAJESH 6.5X40 MM POST TI COCR CD HORZ SOLERA DISP - [id number]  SCREW SPNL RAJESH 6.5X40 MM POST TI COCR CD HORZ SOLERA DISP  [employer] SOFAMOR DANEK-WD  N/A 1 Implanted   TERESSA SPNL L50MM IS660LF CO CHROME MOLYBDENUM POST - [id number]  TERESSA SPNL L50MM [id number] CO CHROME MOLYBDENUM POST  [employer] SOFAMOR DANEK-WD  N/A 2 Implanted   SET SCR SPNL DIA4.75MM POST THORLUM SACR TI PERC APPRCH CDH - [id number]  SET SCR SPNL DIA4.75MM POST THORLUM SACR TI PERC APPRCH CDH  [employer] SOFAMOR DANEK-WD  N/A 6 Implanted   LS SPACER; MD CALLAWAY  [id number]; [employer]; REF: [id number]   [id number]   N/A 1 Implanted         Drains:   [REMOVED] Urinary Catheter 04/26/24 Zapata (Removed)       Findings:  Infection Present At Time Of Surgery (PATOS) (choose all levels that have infection present):  No infection present  Other Findings:

## 2024-04-26 NOTE — DISCHARGE INSTRUCTIONS
Orthopedic Spine Discharge Instructions:  -Mobilize as tolerated. Avoid excessive Bending, Lifting, and Twisting (BLT).  -Maintain surgical dressing in place until follow-up if possible. May shower in 48 hours, let water run over dressing, but do not scrub. If dressing comes off with activity and hygiene, may leave uncovered if no drainage. Otherwise may replace with simple gauze and tape dressing.  -Ice (20 minutes on and off 1 hour) as needed for swelling/pain.  -Drink plenty of fluids.  -Call the office or come to Emergency Room if signs of infection appear (hot, swollen, red, draining pus, fever).  -Take medications as prescribed.  -Wean off narcotics (Percocet/Norco) as soon as possible. Do not take Tylenol if still taking narcotics.  -No alcoholic beverages or driving/operating machinery while on narcotics  -Follow up with Dr. Alfaro in his office in 10-14 days after surgery/discharge. Call [phone] to schedule.

## 2024-04-26 NOTE — ANESTHESIA PRE PROCEDURE
Department of Anesthesiology  Preprocedure Note       Name:  Ventura Victoria   Age:  54 y.o.  :  1970                                          MRN:  [id number]         Date:  2024      Surgeon: Surgeon(s):  Arsh Alfaro, Arsh Sandoval, Cliff Butcher DO    Procedure: Procedure(s):  ANTERIOR LUMBAR INTERBODY FUSION L4-5, L5-S1  MAZOR ROBOTIC ASSISTED PERCUTANEOUS POSTERIOR INSTRUMENTATION L4-L5, L5-S1  ([employer], SILVIA TABLE, C-ARM, *CELL-SAVER*)  ABDOMINAL APPROACH    Medications prior to admission:   Prior to Admission medications    Medication Sig Start Date End Date Taking? Authorizing Provider   lisinopril (PRINIVIL;ZESTRIL) 10 MG tablet Take 1 tablet by mouth daily 19   Shereen Mina MD   ROSUVASTATIN CALCIUM PO Take 10 mg by mouth daily (with breakfast)    Shereen Mina MD   mesalamine (LIALDA) 1.2 g EC tablet Take 1 tablet by mouth daily (with breakfast)   Takes 4 tabs q am w/ breakfast 22   Shereen Mina MD   GABAPENTIN PO Take 300 mg by mouth nightly    Shereen Mina MD   montelukast (SINGULAIR) 10 MG tablet Take 1 tablet by mouth nightly 3/19/17   Sehreen Mina MD   Misc Natural Products (TURMERIC CURCUMIN) CAPS Take by mouth Doesn't know dose. Takes 2 caps q am and 1 cap q HS    Shereen Mina MD   acetaminophen (TYLENOL) 500 MG tablet Take 1 tablet by mouth every 6 hours as needed for Pain Takes 2 tab at 9 am and 4 pm    Shereen Mina MD   ibuprofen (ADVIL;MOTRIN) 200 MG tablet Take 1 tablet by mouth every 6 hours as needed for Pain Takes 2 tabs at 0430, 1200 and HS    Shereen Mina MD   albuterol sulfate HFA (PROAIR HFA) 108 (90 Base) MCG/ACT inhaler Inhale 1 puff into the lungs as needed Last reported use 2024 for sob after walking 2/10/17   Shereen Mina MD   fluticasone (FLONASE) 50 MCG/ACT nasal spray 2 sprays by Nasal route daily 17   Shereen Mina MD       Current

## 2024-04-26 NOTE — ANESTHESIA PROCEDURE NOTES
Airway  Date/Time: 4/26/2024 1:32 PM  Urgency: elective    Airway not difficult    General Information and Staff    Patient location during procedure: OR  Resident/CRNA: Navarro Diop MD  Performed: resident/CRNA   Performed by: Navarro Diop MD  Authorized by: Rohini Barragan MD      Indications and Patient Condition  Indications for airway management: anesthesia  Preoxygenated: yes  Patient position: sniffing  Mask difficulty assessment: difficult bag mask (inadequate, unstable or two providers) +/- NMBA    Final Airway Details  Final airway type: endotracheal airway      Successful airway: ETT  Cuffed: yes   Successful intubation technique: video laryngoscopy  Endotracheal tube insertion site: oral  Blade: Mae  Blade size: #4  ETT size (mm): 7.5  Cormack-Lehane Classification: grade I - full view of glottis  Placement verified by: chest auscultation and capnometry   Measured from: teeth  ETT to teeth (cm): 24  Number of attempts at approach: 1    no

## 2024-04-26 NOTE — H&P
Pt Name: Ventura Victoria  MRN: [id number]  YOB: 1970  Date of evaluation: 4/26/2024    I have reviewed the patient's history and physical examination completed in pre-admission testing on 4/11/24. Original note copied and pasted below.     No changes to history or on examination today, unless noted below.   He reports history of UC and that he had recent stool testing per his PCP for diarrhea and that he has been cleared  by PCP thereafter. No other changes.    LAURA LUTZ, APRN - CNP  4/26/24  11:41 AM    History and Physical    Pt Name: Ventura Victoria  MRN: [id number]  YOB: 1970  Date of evaluation: 4/11/2024    SUBJECTIVE:   History of Chief Complaint:    Patient presents for PAT appointment. He reports a several year history of lower back pain, RLE symptoms.  He says that symptoms were more manageable up until the past year, when symptoms increased in severity.  Patient says that he has not been able to be as active as he had been in the past due to this.  He has failed conservative treatment over time.  Currently he has lower back pain, RLE pain with numbness/tingling.  Patient has been scheduled for ANTERIOR LUMBAR INTERBODY FUSION L4-5, L5-S1.   Past Medical History    has a past medical history of Asthma, exercise induced, Former smoker, Hyperlipidemia, Hypertension, Sleep apnea, Ulcerative colitis (HCC), Under care of team, Under care of team, Wears eyeglasses, and Wellness examination.  Past Surgical History   has a past surgical history that includes nasal endoscopy (Bilateral, 2017); ORIF ankle fracture (Left, 1986); Knee cartilage surgery (Right, 2010); tumor removal; and Colonoscopy.  Medications  Prior to Admission medications    Medication Sig Start Date End Date Taking? Authorizing Provider   lisinopril (PRINIVIL;ZESTRIL) 10 MG tablet Take 1 tablet by mouth daily 8/8/19  Yes Provider, MD Shereen   mesalamine (LIALDA) 1.2 g EC tablet Take 1 tablet by mouth daily (with breakfast)

## 2024-04-26 NOTE — ANESTHESIA POSTPROCEDURE EVALUATION
Department of Anesthesiology  Postprocedure Note    Patient: Ventura Victoria  MRN: [id number]  YOB: 1970  Date of evaluation: 4/26/2024    Procedure Summary       Date: 04/26/24 Room / Location: [address]    Anesthesia Start: 1315 Anesthesia Stop: 1902    Procedures:       ANTERIOR LUMBAR INTERBODY FUSION L4-5, L5-S1      MAZOR ROBOTIC ASSISTED PERCUTANEOUS POSTERIOR INSTRUMENTATION L4-L5, L5-S1      ABDOMINAL APPROACH Diagnosis:       Spinal stenosis, unspecified spinal region      Lumbar spine instability      (Spinal stenosis, unspecified spinal region [M48.00])      (Lumbar spine instability [M53.2X6])    Surgeons: Asrh Alfaro DO; Cliff Alvarenga DO Responsible Provider: Ramírez Dooley MD    Anesthesia Type: general ASA Status: 3            Anesthesia Type: No value filed.    Lotus Phase I:      Lotus Phase II:      Anesthesia Post Evaluation    Patient location during evaluation: PACU  Patient participation: complete - patient participated  Level of consciousness: awake  Pain score: 1  Airway patency: patent  Nausea & Vomiting: no nausea and no vomiting  Cardiovascular status: blood pressure returned to baseline and hemodynamically stable  Respiratory status: acceptable  Hydration status: euvolemic  Pain management: adequate    No notable events documented.

## 2024-04-26 NOTE — PLAN OF CARE
Patient:  Ventura Victoria  YOB: 1970     54 y.o. male    Orthopedic post-operative instructions    Ventura Victoria is a 54 y.o. male who is being seen for:    - Lumbar spondylolisthesis s/p ALIF L4-S1 with posterior instrumentation, POD#0    - No further orthopaedic surgical intervention planned at this time   - Soft dressings on abdomen, lumbar spine . Please maintain and keep clean and dry. Reinforce dressings as needed per nursing.  - AAT. Avoid excessive bending, twisting, lifting.   - Complete post-op antibiotics: Ancef 2g q8h x2 doses  - Diet: Ok for Adult diet from ortho perspective   - DVT ppx: OK for chemical AC POD3-5   - PT/OT evaluation post-op.  - Pain control and medical management per primary  -IM consulted for assistance with medical management and home medication reorder.  - Ice for pain and swelling   - Follow up with Dr. Alfaro in 14 days.  -Please contact Ortho on call with any questions    Tapan Sy DO  Orthopedic Surgery Resident, PGY-1  [place], [place]

## 2024-04-26 NOTE — ANESTHESIA PROCEDURE NOTES
Arterial Line:    An arterial line was placed using ultrasound guidance and surface landmarks, in the OR for the following indication(s): continuous blood pressure monitoring and blood sampling needed.    A  (size), 1 and 3/4 inch (length), Angiocath (type) catheter was placed, Seldinger technique used, into the left radial artery, secured by tape and Tegaderm.  Anesthesia type: General    Events:  patient tolerated procedure well with no complications.4/26/2024 12:35 PM4/26/2024 12:40 PM  Resident/CRNA: Navarro Diop MD  Performed: Resident/CRNA   Preanesthetic Checklist  Completed: patient identified, IV checked, site marked, risks and benefits discussed, surgical/procedural consents, equipment checked, pre-op evaluation, timeout performed, anesthesia consent given, oxygen available, monitors applied/VS acknowledged, fire risk safety assessment completed and verbalized and blood product R/B/A discussed and consented

## 2024-04-27 LAB
ANION GAP SERPL CALCULATED.3IONS-SCNC: 11 MMOL/L (ref 9–16)
ANION GAP SERPL CALCULATED.3IONS-SCNC: 13 MMOL/L (ref 9–16)
BUN SERPL-MCNC: 21 MG/DL (ref 6–20)
BUN SERPL-MCNC: 24 MG/DL (ref 6–20)
CALCIUM SERPL-MCNC: 7.9 MG/DL (ref 8.6–10.4)
CALCIUM SERPL-MCNC: 8.1 MG/DL (ref 8.6–10.4)
CHLORIDE SERPL-SCNC: 102 MMOL/L (ref 98–107)
CHLORIDE SERPL-SCNC: 103 MMOL/L (ref 98–107)
CO2 SERPL-SCNC: 19 MMOL/L (ref 20–31)
CO2 SERPL-SCNC: 19 MMOL/L (ref 20–31)
CREAT SERPL-MCNC: 0.8 MG/DL (ref 0.7–1.2)
CREAT SERPL-MCNC: 0.9 MG/DL (ref 0.7–1.2)
ERYTHROCYTE [DISTWIDTH] IN BLOOD BY AUTOMATED COUNT: 11.9 % (ref 11.8–14.4)
GFR SERPL CREATININE-BSD FRML MDRD: >90 ML/MIN/1.73M2
GFR SERPL CREATININE-BSD FRML MDRD: >90 ML/MIN/1.73M2
GLUCOSE SERPL-MCNC: 136 MG/DL (ref 74–99)
GLUCOSE SERPL-MCNC: 146 MG/DL (ref 74–99)
HCT VFR BLD AUTO: 37.2 % (ref 40.7–50.3)
HGB BLD-MCNC: 12.6 G/DL (ref 13–17)
MCH RBC QN AUTO: 31.3 PG (ref 25.2–33.5)
MCHC RBC AUTO-ENTMCNC: 33.9 G/DL (ref 28.4–34.8)
MCV RBC AUTO: 92.5 FL (ref 82.6–102.9)
NRBC BLD-RTO: 0 PER 100 WBC
PLATELET # BLD AUTO: 306 K/UL (ref 138–453)
PMV BLD AUTO: 9 FL (ref 8.1–13.5)
POTASSIUM SERPL-SCNC: 3.8 MMOL/L (ref 3.7–5.3)
POTASSIUM SERPL-SCNC: 4 MMOL/L (ref 3.7–5.3)
RBC # BLD AUTO: 4.02 M/UL (ref 4.21–5.77)
SODIUM SERPL-SCNC: 133 MMOL/L (ref 136–145)
SODIUM SERPL-SCNC: 134 MMOL/L (ref 136–145)
WBC OTHER # BLD: 11.1 K/UL (ref 3.5–11.3)

## 2024-04-27 PROCEDURE — 6360000002 HC RX W HCPCS

## 2024-04-27 PROCEDURE — 85027 COMPLETE CBC AUTOMATED: CPT

## 2024-04-27 PROCEDURE — 6370000000 HC RX 637 (ALT 250 FOR IP)

## 2024-04-27 PROCEDURE — 80048 BASIC METABOLIC PNL TOTAL CA: CPT

## 2024-04-27 PROCEDURE — 2580000003 HC RX 258: Performed by: ANESTHESIOLOGY

## 2024-04-27 PROCEDURE — 97162 PT EVAL MOD COMPLEX 30 MIN: CPT

## 2024-04-27 PROCEDURE — 36415 COLL VENOUS BLD VENIPUNCTURE: CPT

## 2024-04-27 PROCEDURE — 97166 OT EVAL MOD COMPLEX 45 MIN: CPT

## 2024-04-27 PROCEDURE — 2580000003 HC RX 258

## 2024-04-27 PROCEDURE — 97535 SELF CARE MNGMENT TRAINING: CPT

## 2024-04-27 PROCEDURE — 97530 THERAPEUTIC ACTIVITIES: CPT

## 2024-04-27 PROCEDURE — 1200000000 HC SEMI PRIVATE

## 2024-04-27 PROCEDURE — 97116 GAIT TRAINING THERAPY: CPT

## 2024-04-27 RX ORDER — DIPHENHYDRAMINE HYDROCHLORIDE 50 MG/ML
12.5 INJECTION INTRAMUSCULAR; INTRAVENOUS
Status: ACTIVE | OUTPATIENT
Start: 2024-04-27 | End: 2024-04-28

## 2024-04-27 RX ORDER — SODIUM CHLORIDE 9 MG/ML
INJECTION, SOLUTION INTRAVENOUS PRN
Status: DISCONTINUED | OUTPATIENT
Start: 2024-04-27 | End: 2024-04-28 | Stop reason: HOSPADM

## 2024-04-27 RX ORDER — SODIUM CHLORIDE 0.9 % (FLUSH) 0.9 %
5-40 SYRINGE (ML) INJECTION PRN
Status: DISCONTINUED | OUTPATIENT
Start: 2024-04-27 | End: 2024-04-28 | Stop reason: HOSPADM

## 2024-04-27 RX ORDER — FENTANYL CITRATE 50 UG/ML
50 INJECTION, SOLUTION INTRAMUSCULAR; INTRAVENOUS EVERY 5 MIN PRN
Status: DISCONTINUED | OUTPATIENT
Start: 2024-04-27 | End: 2024-04-28 | Stop reason: HOSPADM

## 2024-04-27 RX ORDER — FENTANYL CITRATE 50 UG/ML
25 INJECTION, SOLUTION INTRAMUSCULAR; INTRAVENOUS EVERY 5 MIN PRN
Status: DISCONTINUED | OUTPATIENT
Start: 2024-04-27 | End: 2024-04-28 | Stop reason: HOSPADM

## 2024-04-27 RX ORDER — NALOXONE HYDROCHLORIDE 0.4 MG/ML
INJECTION, SOLUTION INTRAMUSCULAR; INTRAVENOUS; SUBCUTANEOUS PRN
Status: DISCONTINUED | OUTPATIENT
Start: 2024-04-27 | End: 2024-04-28 | Stop reason: HOSPADM

## 2024-04-27 RX ORDER — ONDANSETRON 2 MG/ML
4 INJECTION INTRAMUSCULAR; INTRAVENOUS
Status: ACTIVE | OUTPATIENT
Start: 2024-04-27 | End: 2024-04-28

## 2024-04-27 RX ORDER — SODIUM CHLORIDE 0.9 % (FLUSH) 0.9 %
5-40 SYRINGE (ML) INJECTION EVERY 12 HOURS SCHEDULED
Status: DISCONTINUED | OUTPATIENT
Start: 2024-04-27 | End: 2024-04-28 | Stop reason: HOSPADM

## 2024-04-27 RX ADMIN — SODIUM CHLORIDE: 9 INJECTION, SOLUTION INTRAVENOUS at 06:07

## 2024-04-27 RX ADMIN — DOCUSATE SODIUM 50 MG AND SENNOSIDES 8.6 MG 1 TABLET: 8.6; 5 TABLET, FILM COATED ORAL at 08:59

## 2024-04-27 RX ADMIN — SODIUM CHLORIDE, PRESERVATIVE FREE 10 ML: 5 INJECTION INTRAVENOUS at 08:57

## 2024-04-27 RX ADMIN — Medication 3000 MG: at 08:56

## 2024-04-27 RX ADMIN — OXYCODONE 10 MG: 5 TABLET ORAL at 13:33

## 2024-04-27 RX ADMIN — ACETAMINOPHEN 650 MG: 325 TABLET ORAL at 08:58

## 2024-04-27 RX ADMIN — ACETAMINOPHEN 650 MG: 325 TABLET ORAL at 20:28

## 2024-04-27 RX ADMIN — FLUTICASONE PROPIONATE 2 SPRAY: 50 SPRAY, METERED NASAL at 08:57

## 2024-04-27 RX ADMIN — ACETAMINOPHEN 650 MG: 325 TABLET ORAL at 02:44

## 2024-04-27 RX ADMIN — Medication 3000 MG: at 01:13

## 2024-04-27 RX ADMIN — OXYCODONE HYDROCHLORIDE 5 MG: 5 TABLET ORAL at 00:03

## 2024-04-27 RX ADMIN — OXYCODONE 10 MG: 5 TABLET ORAL at 22:21

## 2024-04-27 RX ADMIN — MONTELUKAST SODIUM 10 MG: 10 TABLET, FILM COATED ORAL at 20:28

## 2024-04-27 RX ADMIN — MESALAMINE 400 MG: 400 CAPSULE, DELAYED RELEASE ORAL at 20:28

## 2024-04-27 RX ADMIN — MESALAMINE 400 MG: 400 CAPSULE, DELAYED RELEASE ORAL at 13:30

## 2024-04-27 RX ADMIN — ROSUVASTATIN CALCIUM 10 MG: 10 TABLET, COATED ORAL at 08:57

## 2024-04-27 RX ADMIN — LISINOPRIL 10 MG: 10 TABLET ORAL at 08:59

## 2024-04-27 RX ADMIN — MESALAMINE 400 MG: 400 CAPSULE, DELAYED RELEASE ORAL at 08:59

## 2024-04-27 RX ADMIN — CYCLOBENZAPRINE 10 MG: 10 TABLET, FILM COATED ORAL at 17:37

## 2024-04-27 RX ADMIN — ACETAMINOPHEN 650 MG: 325 TABLET ORAL at 13:29

## 2024-04-27 RX ADMIN — SODIUM CHLORIDE, PRESERVATIVE FREE 10 ML: 5 INJECTION INTRAVENOUS at 20:28

## 2024-04-27 ASSESSMENT — PAIN DESCRIPTION - PAIN TYPE: TYPE: ACUTE PAIN;SURGICAL PAIN

## 2024-04-27 ASSESSMENT — PAIN SCALES - GENERAL
PAINLEVEL_OUTOF10: 8
PAINLEVEL_OUTOF10: 7
PAINLEVEL_OUTOF10: 5
PAINLEVEL_OUTOF10: 10
PAINLEVEL_OUTOF10: 6
PAINLEVEL_OUTOF10: 6
PAINLEVEL_OUTOF10: 10
PAINLEVEL_OUTOF10: 5
PAINLEVEL_OUTOF10: 3
PAINLEVEL_OUTOF10: 9
PAINLEVEL_OUTOF10: 6
PAINLEVEL_OUTOF10: 6
PAINLEVEL_OUTOF10: 5
PAINLEVEL_OUTOF10: 6

## 2024-04-27 ASSESSMENT — PAIN - FUNCTIONAL ASSESSMENT
PAIN_FUNCTIONAL_ASSESSMENT: PREVENTS OR INTERFERES SOME ACTIVE ACTIVITIES AND ADLS

## 2024-04-27 ASSESSMENT — PAIN DESCRIPTION - ORIENTATION
ORIENTATION: LOWER
ORIENTATION: LOWER;MID
ORIENTATION: LOWER;MID;POSTERIOR;ANTERIOR
ORIENTATION: LOWER;MID;POSTERIOR;ANTERIOR
ORIENTATION: LOWER;MID;ANTERIOR;POSTERIOR
ORIENTATION: MID;LOWER
ORIENTATION: MID;LOWER;ANTERIOR;POSTERIOR
ORIENTATION: LOWER;MID;POSTERIOR;ANTERIOR

## 2024-04-27 ASSESSMENT — PAIN DESCRIPTION - DESCRIPTORS
DESCRIPTORS: SHARP
DESCRIPTORS: SHARP
DESCRIPTORS: ACHING;DISCOMFORT;SORE;SHARP;STABBING
DESCRIPTORS: ACHING;SORE;DISCOMFORT
DESCRIPTORS: SHARP
DESCRIPTORS: ACHING;DISCOMFORT
DESCRIPTORS: ACHING;DISCOMFORT;SHARP;SORE;STABBING

## 2024-04-27 ASSESSMENT — PAIN DESCRIPTION - LOCATION
LOCATION: BACK;ABDOMEN
LOCATION: ABDOMEN;BACK
LOCATION: ABDOMEN;BACK
LOCATION: BACK;ABDOMEN
LOCATION: ABDOMEN;BACK
LOCATION: BACK
LOCATION: ABDOMEN;BACK
LOCATION: BACK

## 2024-04-27 NOTE — PLAN OF CARE
Problem: Discharge Planning  Goal: Discharge to home or other facility with appropriate resources  4/27/2024 0528 by Yulia Jaquez RN  Outcome: Progressing  4/27/2024 0116 by Clarissa Gutierrez RN  Outcome: Progressing     Problem: Safety - Adult  Goal: Free from fall injury  4/27/2024 0528 by Yulia Jaquez RN  Outcome: Progressing  4/27/2024 0116 by Clarissa Gutierrez RN  Outcome: Progressing     Problem: Pain  Goal: Verbalizes/displays adequate comfort level or baseline comfort level  4/27/2024 0528 by Yulia Jaquez RN  Outcome: Progressing  4/27/2024 0116 by Clarissa Gutierrez RN  Outcome: Progressing     Problem: ABCDS Injury Assessment  Goal: Absence of physical injury  4/27/2024 0528 by Yulia Jaquez RN  Outcome: Progressing  4/27/2024 0116 by Clarissa Gutierrez RN  Outcome: Progressing

## 2024-04-27 NOTE — PLAN OF CARE
Problem: Discharge Planning  Goal: Discharge to home or other facility with appropriate resources  4/27/2024 1516 by Quang Bhagat RN  Outcome: Progressing  4/27/2024 0528 by Yulia Jaquez RN  Outcome: Progressing     Problem: Safety - Adult  Goal: Free from fall injury  4/27/2024 1516 by Quang Bhagat RN  Outcome: Progressing  4/27/2024 0528 by Yulia Jaquez RN  Outcome: Progressing     Problem: Pain  Goal: Verbalizes/displays adequate comfort level or baseline comfort level  4/27/2024 1516 by Quang Bhagat RN  Outcome: Progressing  4/27/2024 0528 by Yulia Jaquez RN  Outcome: Progressing     Problem: ABCDS Injury Assessment  Goal: Absence of physical injury  4/27/2024 1516 by Quang Bhagat RN  Outcome: Progressing  4/27/2024 0528 by Yulia Jaquez RN  Outcome: Progressing

## 2024-04-28 VITALS
BODY MASS INDEX: 34.71 KG/M2 | HEIGHT: 76 IN | RESPIRATION RATE: 16 BRPM | SYSTOLIC BLOOD PRESSURE: 138 MMHG | HEART RATE: 80 BPM | OXYGEN SATURATION: 95 % | TEMPERATURE: 97.9 F | DIASTOLIC BLOOD PRESSURE: 61 MMHG | WEIGHT: 285.06 LBS

## 2024-04-28 PROCEDURE — 97116 GAIT TRAINING THERAPY: CPT

## 2024-04-28 PROCEDURE — 97110 THERAPEUTIC EXERCISES: CPT

## 2024-04-28 PROCEDURE — 2580000003 HC RX 258: Performed by: ANESTHESIOLOGY

## 2024-04-28 PROCEDURE — 97530 THERAPEUTIC ACTIVITIES: CPT

## 2024-04-28 PROCEDURE — 6370000000 HC RX 637 (ALT 250 FOR IP)

## 2024-04-28 RX ADMIN — LISINOPRIL 10 MG: 10 TABLET ORAL at 09:10

## 2024-04-28 RX ADMIN — CYCLOBENZAPRINE 10 MG: 10 TABLET, FILM COATED ORAL at 09:09

## 2024-04-28 RX ADMIN — FLUTICASONE PROPIONATE 2 SPRAY: 50 SPRAY, METERED NASAL at 09:06

## 2024-04-28 RX ADMIN — OXYCODONE 10 MG: 5 TABLET ORAL at 11:18

## 2024-04-28 RX ADMIN — ROSUVASTATIN CALCIUM 10 MG: 10 TABLET, COATED ORAL at 09:08

## 2024-04-28 RX ADMIN — ACETAMINOPHEN 650 MG: 325 TABLET ORAL at 03:22

## 2024-04-28 RX ADMIN — OXYCODONE 10 MG: 5 TABLET ORAL at 03:40

## 2024-04-28 RX ADMIN — DOCUSATE SODIUM 50 MG AND SENNOSIDES 8.6 MG 1 TABLET: 8.6; 5 TABLET, FILM COATED ORAL at 09:07

## 2024-04-28 RX ADMIN — MESALAMINE 400 MG: 400 CAPSULE, DELAYED RELEASE ORAL at 09:09

## 2024-04-28 RX ADMIN — Medication 1 PUFF: at 03:48

## 2024-04-28 RX ADMIN — ACETAMINOPHEN 650 MG: 325 TABLET ORAL at 09:07

## 2024-04-28 RX ADMIN — SODIUM CHLORIDE, PRESERVATIVE FREE 10 ML: 5 INJECTION INTRAVENOUS at 09:13

## 2024-04-28 ASSESSMENT — PAIN SCALES - GENERAL
PAINLEVEL_OUTOF10: 5
PAINLEVEL_OUTOF10: 6
PAINLEVEL_OUTOF10: 7
PAINLEVEL_OUTOF10: 7
PAINLEVEL_OUTOF10: 4
PAINLEVEL_OUTOF10: 7

## 2024-04-28 ASSESSMENT — PAIN DESCRIPTION - DESCRIPTORS
DESCRIPTORS: ACHING;DISCOMFORT;SORE;STABBING
DESCRIPTORS: ACHING;DISCOMFORT;SORE

## 2024-04-28 ASSESSMENT — PAIN DESCRIPTION - ORIENTATION
ORIENTATION: LOWER;MID;ANTERIOR;POSTERIOR
ORIENTATION: LOWER;MID;ANTERIOR;POSTERIOR

## 2024-04-28 ASSESSMENT — PAIN DESCRIPTION - LOCATION
LOCATION: ABDOMEN;BACK
LOCATION: INCISION
LOCATION: ABDOMEN;BACK

## 2024-04-28 NOTE — PROGRESS NOTES
Orthopedic Progress Note    Patient:  Ventura Victoria  YOB: 1970     54 y.o. male    Subjective:  Patient seen and examined this morning. No complaints or concerns. No issues overnight per nursing. Pain is well controlled on current regimen. Denies fever, HA, CP, SOB, N/V, dysuria, new numbness/tingling. No BM/flatus.  Patient has not been formally evaluated by physical or occupational therapy, however he was able to ambulate on his own with the use of a rolling walker to the restroom this morning and back.  Patient would like to work with physical therapy specifically on stairs, and states if he is able to handle them he thinks he would be able to go home today.    Vitals reviewed, afebrile    Objective:   Vitals:    04/27/24 0351   BP:    Pulse: 73   Resp: 13   Temp:    SpO2: 94%     Gen: NAD, cooperative    Cardiovascular: Regular rate   Respiratory: No acute respiratory distress, breathing comfortably    Orthopedic Exam  Abdomen: Optifoam on abdomen is clean/dry/intact without strikethrough.  Patient is appropriately tender to palpation about surgical site.    Lumbar spine: Dressings are clean/dry/intact without strikethrough.  Patient is appropriately tender to palpation about surgical site.  Patient able to flex and extend the lumbar spine.    BLE: Skin intact.  Compartments soft and compressible.  Patient able to ambulate with use of rolling walker.  5 out of 5 strength with hip flexion/extension, knee flexion/extension, ankle plantar/dorsiflexion, hallux flexion/extension.  TA/GSC/FHL/EHL motor complexes intact.  L2-S1 sensation intact to light touch.  Extremities are warm and well-perfused with BCR.    Recent Labs     04/27/24  0341   WBC 11.1   HGB 12.6*   HCT 37.2*      *   K 4.0   BUN 24*   CREATININE 0.9   GLUCOSE 136*      Meds:   Abx: Ancef  See rec for complete list    Impression 54 y.o. male being seen for:    -L4-S1 ALIF with posterior instrumentation.    Plan  - 
CLINICAL PHARMACY NOTE: MEDS TO BEDS    Total # of Prescriptions Filled: 3   The following medications were delivered to the patient:  Docusate  Oxycodone  cyclobenzaprine    Additional Documentation: Melita majano  
Occupational Therapy  Facility/Department: [address] BURN UNIT  Occupational Therapy Initial Assessment    Name: Ventura Victoria  : 1970  MRN: [id number]  Date of Service: 2024    s/p ALIF L4-5, L5-S1, percutaneous posterior instrumentation L4-L5, L5-S1 24    Discharge Recommendations:  Patient would benefit from continued therapy after discharge  OT Equipment Recommendations  Equipment Needed: Yes  Mobility Devices: ADL Assistive Devices  ADL Assistive Devices: Transfer Tub Bench       Patient Diagnosis(es): The encounter diagnosis was Post-op pain.  Past Medical History:  has a past medical history of Asthma, exercise induced, Former smoker, Hyperlipidemia, Hypertension, Sleep apnea, Ulcerative colitis (HCC), Under care of team, Under care of team, Wears eyeglasses, and Wellness examination.  Past Surgical History:  has a past surgical history that includes nasal endoscopy (Bilateral, ); ORIF ankle fracture (Left, ); Knee cartilage surgery (Right, ); tumor removal; Colonoscopy; and cervical fusion (2024).           Assessment   Performance deficits / Impairments: Decreased functional mobility ;Decreased ADL status;Decreased endurance;Decreased balance;Decreased high-level IADLs  Assessment: Pt agreed to OT this date. Pt completed bed mobility with Mod A, following thorough ed on log roll tech. Pt engaged in static and dynamic sitting activities with SUP for ~25 minutes. ADLs facilitated this date included toileting and UB dressing. Pt requires increased assistance with LB ADLs and toileting d/t functional reach impairments and balance deficits. See below for specifics regarding levels of assistance. Functional transfers required Min-Mod A at different surface heights with pt requiring VCs for hand placement and tech. Functional mobility was performed with use of RW and CGA d/t balance impairments secondary to RLE weakness. Pt will benefit from continued OT services to address deficits 
Physical Therapy  Facility/Department: [address] [facility]URN UNIT  Physical Therapy Treatment Note    Name: Ventura Victoria  : 1970  MRN: [id number]  Date of Service: 2024    Discharge Recommendations:  Patient would benefit from continued therapy after discharge   PT Equipment Recommendations  Equipment Needed: No  Other: pt owns RW      Patient Diagnosis(es): The encounter diagnosis was Post-op pain.  Past Medical History:  has a past medical history of Asthma, exercise induced, Former smoker, Hyperlipidemia, Hypertension, Sleep apnea, Ulcerative colitis (HCC), Under care of team, Under care of team, Wears eyeglasses, and Wellness examination.  Past Surgical History:  has a past surgical history that includes nasal endoscopy (Bilateral, ); ORIF ankle fracture (Left, ); Knee cartilage surgery (Right, ); tumor removal; Colonoscopy; and cervical fusion (2024).    Assessment   Assessment: Pt completed bed mobility with Rachel with both therapist and then girlfriend assisting. Pt required CGA for function tranfers and ambulation. Pt ambulated 250ft with RW and completed 6 steps with CGA. Pt and girlfriend educated at length re: precautions, HEP and use of AD for mobility. Pt and SO verbalized good understanding of safety precatuions within the home, for bed mobility and stair navigation. Pt would benefit from continued PT to regain functional independence.  Therapy Prognosis: Good  Requires PT Follow-Up: Yes  Activity Tolerance  Activity Tolerance: Patient limited by pain;Patient tolerated treatment well     Plan   Physical Therapy Plan  General Plan: 1 time a day 7 days a week  Specific Instructions for Next Treatment: Progress to steps tomorrow  Current Treatment Recommendations: Strengthening, ROM, Balance training, Functional mobility training, Transfer training, Endurance training, Gait training, Stair training, Home exercise program, Safety education & training, Patient/Caregiver education & 
[place], [place]    Please excuse any typos/errors, as this note was created with the assistance of voice recognition software. While intending to generate a document that actually reflects the content of the visit, the document can still have some errors including those of syntax and sound-a-like substitutions which may escape proof reading. In such instances, actual meaning can be extrapolated by context.    
Bed mobility, sit<>stand  Education Method: Demonstration;Verbal  Education Outcome: Continued education needed;Verbalized understanding      Therapy Time   Individual Concurrent Group Co-treatment   Time In 1143         Time Out 1224         Minutes 41         Timed Code Treatment Minutes: 23 Minutes    Co-eval with OTR for safety  Co-treatment with OT warranted secondary to decreased safety and independence requiring 2 skilled therapy professionals to address individual discipline's goals. PT addressing transfer/mobility training.      Armando King, PT

## 2024-04-28 NOTE — PLAN OF CARE
Problem: Discharge Planning  Goal: Discharge to home or other facility with appropriate resources  4/28/2024 1417 by Quang Bhagat RN  Outcome: Adequate for Discharge  4/28/2024 0527 by Yulia Jaquez RN  Outcome: Progressing     Problem: Safety - Adult  Goal: Free from fall injury  4/28/2024 1417 by Quang Bhagat RN  Outcome: Adequate for Discharge  4/28/2024 0527 by Yulia Jaquez RN  Outcome: Progressing     Problem: Pain  Goal: Verbalizes/displays adequate comfort level or baseline comfort level  4/28/2024 1417 by Quang Bhagat RN  Outcome: Adequate for Discharge  4/28/2024 0527 by Yulia Jaquez RN  Outcome: Progressing     Problem: ABCDS Injury Assessment  Goal: Absence of physical injury  4/28/2024 1417 by Quang Bhagat RN  Outcome: Adequate for Discharge  4/28/2024 0527 by Yulia Jaquez RN  Outcome: Progressing

## 2024-04-28 NOTE — DISCHARGE SUMMARY
Patient education was discussed with patient and his girlfriend. No bending, twisting, or lifting order was emphasized to the patient. Patient verbalized understanding of all education provided.  Doctor contact information was provided to patient to schedule follow up appointment. Patients I.V's were removed. All patient belongings were returned to patient. Patient was taken to his vehicle via wheelchair and assisted into his vehicle without incident.

## 2024-04-28 NOTE — CARE COORDINATION
Transitional Planning  VMM received from Scott CORREA stating patient needs a walker. Went to room. PT present along with patient and girlfriend. State they have a wheeled walker at home, no need for one. Update provided to Scott CORREA.      Discharge Report    [facility]  Clinical Case Management Department  Written by: Caro Carrillo RN    Patient Name: Ventura Victoria  Attending Provider: No att. providers found  Admit Date: 2024  9:12 AM  MRN: [id number]  Account: [id number]                     : 1970  Discharge Date: 2024      Disposition: home    Caro Carrillo RN    
Return to Present Housing: (P) Yes  Other Identified Issues/Barriers to RETURNING to current housing: na   Potential Assistance needed at discharge: (P) N/A            Potential DME:    Patient expects to discharge to: (P) House  Plan for transportation at discharge: (P) Family    Financial    Payor: CIGNA / Plan: CIGNA / Product Type: *No Product type* /     Does insurance require precert for SNF: Yes    Potential assistance Purchasing Medications: (P) No  Meds-to-Beds request: Yes      [id number] - [place], [place] - [address]  - P [phone] - F [phone]  [address]   [place] [address]  Phone: [phone] Fax: [phone]    [id number] - [facility] [address] [facility] - P [phone] - F [phone]  [address] [facility] [address]  Phone: [phone] Fax: [phone]      Notes:    Factors facilitating achievement of predicted outcomes: Family support, Motivated, Cooperative, and Pleasant    Barriers to discharge: Pain    Additional Case Management Notes: Spoke with patient, role explained, CM information provided. Plan to stay at girlfriends home. Has transportation, denies further needs. Address, PCP and insurance reviewed.      The Plan for Transition of Care is related to the following treatment goals of Spinal stenosis, unspecified spinal region [M48.00]  Lumbar spine instability [M53.2X6]  Status post lumbar spine operation [Z98.890]    IF APPLICABLE: The Patient and/or patient representative Ventura and his family were provided with a choice of provider and agrees with the discharge plan. Freedom of choice list with basic dialogue that supports the patient's individualized plan of care/goals and shares the quality data associated with the providers was provided to: (P) Patient   Patient Representative Name:       The Patient and/or Patient Representative Agree with the Discharge Plan? (P) Yes    Caro Carrillo RN  Case Management

## 2024-04-28 NOTE — PLAN OF CARE
Ventura Victoria was evaluated today and a DME order was entered for a wheeled walker because he requires this to successfully complete daily living tasks of ambulating.  A wheeled walker is necessary due to the patient's unsteady gait, upper body weakness, and inability to  an ambulation device; and he can ambulate only by pushing a walker instead of a lesser assistive device such as a cane, crutch, or standard walker.  The need for this equipment was discussed with the patient and he understands and is in agreement.     Seth Brenner DO  Orthopedic Surgery Resident, PGY-1  [place], [place]

## 2024-04-28 NOTE — DISCHARGE INSTR - COC
129.3 kg (285 lb 0.9 oz)   SpO2 95%   BMI 34.71 kg/m²     Last documented pain score (0-10 scale): Pain Level: 4  Last Weight:   Wt Readings from Last 1 Encounters:   04/27/24 129.3 kg (285 lb 0.9 oz)     Mental Status:  {IP PT MENTAL STATUS:20030}    IV Access:  { FARTUN IV ACCESS:301436144}    Nursing Mobility/ADLs:  Walking   {CHP DME ADLs:972201324}  Transfer  {CHP DME ADLs:344543124}  Bathing  {CHP DME ADLs:355184736}  Dressing  {CHP DME ADLs:123189073}  Toileting  {CHP DME ADLs:478484855}  Feeding  {CHP DME ADLs:910958546}  Med Admin  {CHP DME ADLs:216542133}  Med Delivery   { FARTUN MED Delivery:848351435}    Wound Care Documentation and Therapy:  Incision 04/26/24 Abdomen Medial;Upper (Active)   Dressing Status Clean;Dry;Intact 04/27/24 1951   Incision Cleansed Not Cleansed 04/27/24 1951   Dressing/Treatment Adhesive bandage 04/27/24 1951   Closure Other (Comment) 04/27/24 1951   Margins Other (Comment) 04/27/24 1951   Incision Assessment Other (Comment) 04/27/24 1951   Drainage Amount None (dry) 04/27/24 1951   Odor None 04/27/24 1951   Rand-incision Assessment Other (Comment) 04/27/24 1951   Number of days: 1       Incision 04/26/24 Back Medial (Active)   Dressing Status Clean;Dry;Intact 04/27/24 1951   Incision Cleansed Not Cleansed 04/27/24 1951   Dressing/Treatment Adhesive bandage 04/27/24 1951   Closure Other (Comment) 04/27/24 1951   Margins Other (Comment) 04/27/24 1951   Incision Assessment Other (Comment) 04/27/24 1951   Drainage Amount None (dry) 04/27/24 1951   Odor None 04/27/24 1951   Rand-incision Assessment Other (Comment) 04/27/24 1951   Number of days: 1        Elimination:  Continence:   Bowel: {YES / NO:19727}  Bladder: {YES / NO:19727}  Urinary Catheter: {Urinary Catheter:988476270}   Colostomy/Ileostomy/Ileal Conduit: {YES / NO:19727}       Date of Last BM: ***    Intake/Output Summary (Last 24 hours) at 4/28/2024 0744  Last data filed at 4/27/2024 2251  Gross per 24 hour   Intake 10

## 2024-04-29 NOTE — OP NOTE
Operative Note      Patient: Ventura Victoria  YOB: 1970  MRN: [id number]    Date of Procedure: 4/26/2024    Pre-Op Diagnosis Codes:     * Spinal stenosis, unspecified spinal region [M48.00]     * Lumbar spine instability [M53.2X6]    Post-Op Diagnosis: Same       Procedure(s):  ANTERIOR LUMBAR INTERBODY FUSION L4-5, L5-S1  MAZOR ROBOTIC ASSISTED PERCUTANEOUS POSTERIOR INSTRUMENTATION L4-L5, L5-S1  ([employer], SILVIA TABLE, C-ARM, *CELL-SAVER*)  ABDOMINAL APPROACH    Surgeon(s):  Cliff Alvarenga, Arsh Sandoval DO Blankenship, Arsh ROY DO    Assistant:   Resident: Tapan Sy DO    Anesthesia: General    Estimated Blood Loss (mL): 200     Complications: None    Specimens:   * No specimens in log *    Implants:  Implant Name Type Inv. Item Serial No.  Lot No. LRB No. Used Action   GRAFT BNE SUB 5CC DEMIN BNE MTRX PTTY GRFTON - [id number]  GRAFT BNE SUB 5CC DEMIN BNE MTRX PTTY GRFTON [id number] [employer] SPINALGRAFT TECH-WD   1 Implanted   KIT BNE GRFT XSM 1.4CC RHBMP-2 ABSRB CLLGN SPNG INFUSE - [id number]  KIT BNE GRFT XSM 1.4CC RHBMP-2 ABSRB CLLGN SPNG INFUSE  [employer] SPINALGRAFT TECH-WD [id number]  1 Implanted   [employer] ANTERALIGN SPINAL SYSTEM REF: [id number]     [id number]  1 Implanted   SCREW SPNL LCK 5X30 MM TITAN NANOLOCK STRL ANTERALIGN LTX - [id number]  SCREW SPNL LCK 5X30 MM TITAN NANOLOCK STRL ANTERALIGN LTX  [employer] SOFAMOR DANEK-WD [id number]  1 Implanted   SCREW SPNL LCK 5X30 MM TITAN NANOLOCK STRL ANTERALIGN LTX - [id number]  SCREW SPNL LCK 5X30 MM TITAN NANOLOCK STRL ANTERALIGN LTX  [employer] SOFAMOR DANEK-WD [id number]  1 Implanted   SCREW SPNL LCK 5X20 MM TITAN NANOLOCK STRL ANTERALIGN LTX - [id number]  SCREW SPNL LCK 5X20 MM TITAN NANOLOCK STRL ANTERALIGN LTX  [employer] SOFAMOR DANEK-WD [id number]  1 Implanted   SCREW SPNL LCK 5X20 MM TITAN NANOLOCK STRL ANTERALIGN LTX - [id number]  SCREW SPNL LCK 5X20 MM TITAN NANOLOCK STRL ANTERALIGN LTX  [employer] SOFAMOR 
tolerated, but avoid bending, lifting, twisting as much as possible.  Follow up in office 2 weeks postoperatively.

## (undated) DEVICE — C-ARMOR C-ARM EQUIPMENT COVERS CLEAR STERILE UNIVERSAL FIT 12 PER CASE: Brand: C-ARMOR

## (undated) DEVICE — Device

## (undated) DEVICE — AGENT HEMOSTATIC SURGIFLOW MATRIX KIT W/THROMBIN

## (undated) DEVICE — GLOVE ORANGE PI 8 1/2   MSG9085

## (undated) DEVICE — SUTURE PERMAHAND SZ 0 L30IN NONABSORBABLE BLK SILK BRAID A306H

## (undated) DEVICE — GAUZE,SPONGE,4"X4",16PLY,XRAY,STRL,LF: Brand: MEDLINE

## (undated) DEVICE — STRAP ARMBRD W1.5XL32IN FOAM STR YET SFT W/ HK AND LOOP

## (undated) DEVICE — BLADE,CARBON-STEEL,15,STRL,DISPOSABLE,TB: Brand: MEDLINE

## (undated) DEVICE — ELECTRODE PT RET AD L9FT HI MOIST COND ADH HYDRGEL CORDED

## (undated) DEVICE — PAD,NON-ADHERENT,3X8,STERILE,LF,1/PK: Brand: MEDLINE

## (undated) DEVICE — THE STERILE LIGHT HANDLE COVER IS USED WITH STERIS SURGICAL LIGHTING AND VISUALIZATION SYSTEMS.

## (undated) DEVICE — BLADE ES L6IN ELASTOMERIC COAT INSUL DURABLE BEND UPTO

## (undated) DEVICE — KIT ROBOTIC SPINE DISP MAZORX

## (undated) DEVICE — DRESSING TRNSPAR W5XL4.5IN FLM SHT SEMIPERMEABLE WIND

## (undated) DEVICE — SUTURE MONOCRYL SZ 3 0 L18IN ABSRB UD PS 2 3 8 CIR REV CUT NDL MCP497G

## (undated) DEVICE — MARKER SURG SKIN UTIL BLK REG TIP NONSMEARING W/ 6IN RUL

## (undated) DEVICE — TOWEL,OR,DSP,ST,NATURAL,DLX,4/PK,20PK/CS: Brand: MEDLINE

## (undated) DEVICE — GOWN,SIRUS,NONRNF,SETINSLV,XL,20/CS: Brand: MEDLINE

## (undated) DEVICE — SUTURE PERMAHAND SZ 3-0 L30IN NONABSORBABLE BLK SILK BRAID A304H

## (undated) DEVICE — PROVIDES A STERILE INTERFACE BETWEEN THE OPERATING ROOM SURGICAL LAMPS (NON-STERILE) AND THE SURGEON OR NURSE (STERILE).: Brand: STERION®CLAMP COVER FABRIC

## (undated) DEVICE — APPLICATOR MEDICATED 26 CC SOLUTION HI LT ORNG CHLORAPREP

## (undated) DEVICE — GLOVE ORANGE PI 8   MSG9080

## (undated) DEVICE — SUTURE PERMAHAND SZ 2-0 L30IN NONABSORBABLE BLK SILK W/O A305H

## (undated) DEVICE — CONTAINER,SPECIMEN,4OZ,OR STRL: Brand: MEDLINE

## (undated) DEVICE — OPTIFOAM GENTLE SA, POSTOP, 4X10: Brand: MEDLINE

## (undated) DEVICE — SUTURE VICRYL SZ 2-0 L18IN ABSRB UD CT-1 L36MM 1/2 CIR J839D

## (undated) DEVICE — GOWN,SIRUS,NONRNF,SETINSLV,2XL,18/CS: Brand: MEDLINE

## (undated) DEVICE — 3M™ IOBAN™ 2 ANTIMICROBIAL INCISE DRAPE 6650EZ: Brand: IOBAN™ 2

## (undated) DEVICE — LIQUIBAND RAPID ADHESIVE 36/CS 0.8ML: Brand: MEDLINE

## (undated) DEVICE — Z DISCONTINUED USE 2220295 SUTURE VICRYL SZ 0 L18IN ABSRB UD L36MM CT-1 1/2 CIR J840D

## (undated) DEVICE — BLADE ES L6IN ELASTOMERIC COAT EXT DURABLE BEND UPTO 90DEG

## (undated) DEVICE — SOLUTION IRRIG 1000ML STRL H2O USP PLAS POUR BTL

## (undated) DEVICE — SPONGE,LAP,4"X18",XR,ST,5/PK,40PK/CS: Brand: MEDLINE INDUSTRIES, INC.

## (undated) DEVICE — Device: Brand: SNAP ON SPHERZ

## (undated) DEVICE — STRAP,POSITIONING,KNEE/BODY,FOAM,4X60": Brand: MEDLINE

## (undated) DEVICE — SVMMC ORTH SPINE PK

## (undated) DEVICE — SUTURE PROL SZ 5-0 L36IN NONABSORBABLE BLU L13MM C-1 3/8 8720H

## (undated) DEVICE — TUBING AMB

## (undated) DEVICE — C-ARM: Brand: UNBRANDED

## (undated) DEVICE — CORD ES L12FT BPLR FRCP

## (undated) DEVICE — SVMMC VASC MAJ PK

## (undated) DEVICE — SUTURE VICRYL SZ 0 L27IN ABSRB UD L36MM CT-1 1/2 CIR J260H

## (undated) DEVICE — DRAPE,REIN 53X77,STERILE: Brand: MEDLINE

## (undated) DEVICE — GOWN,AURORA,NONREINFORCED,LARGE: Brand: MEDLINE

## (undated) DEVICE — CLEANER,CAUTERY TIP,2X2",STERILE: Brand: MEDLINE

## (undated) DEVICE — NEEDLE SPNL L3.5IN PNK HUB S STL REG WALL FIT STYL W/ QNCKE

## (undated) DEVICE — SHEET,DRAPE,70X100,STERILE: Brand: MEDLINE

## (undated) DEVICE — BONE SCREW 4675020 LS 5.0X20MM
Type: IMPLANTABLE DEVICE | Site: SPINE LUMBAR | Status: NON-FUNCTIONAL
Brand: ANTERALIGN SPINAL SYSTEM WITH TITAN NANOLOCK SURFACE TECHNOLOGY

## (undated) DEVICE — GARMENT,MEDLINE,DVT,INT,CALF,MED, GEN2: Brand: MEDLINE

## (undated) DEVICE — PROTECTOR ULN NRV PUR FOAM HK LOOP STRP ANATOMICALLY

## (undated) DEVICE — LOOP VES W25MM THK1MM MAXI RED SIL FLD REPELLENT 100 PER

## (undated) DEVICE — 1LYRTR 16FR10ML100%SIL UMS SNP: Brand: MEDLINE INDUSTRIES, INC.

## (undated) DEVICE — SOLUTION IRRIG 1000ML 0.9% SOD CHL USP POUR PLAS BTL

## (undated) DEVICE — SUTURE PDS II SZ 0 L60IN ABSRB VLT L65MM TP-1 1/2 CIR Z991G

## (undated) DEVICE — GAUZE,SPONGE,FLUFF,6"X6.75",STRL,5/TRAY: Brand: MEDLINE

## (undated) DEVICE — SUTURE VICRYL + SZ 2-0 L27IN ABSRB WHT SH 1/2 CIR TAPERCUT VCP417H

## (undated) DEVICE — COVER LT HNDL BLU PLAS

## (undated) DEVICE — MARKER,SKIN,WI/RULER AND LABELS: Brand: MEDLINE

## (undated) DEVICE — SUTURE MONOCRYL SZ 4-0 L18IN ABSRB UD L16MM PC-3 3/8 CIR PRIM Y845G

## (undated) DEVICE — 4.0MM ROUND FLUTED SOFT TOUCH

## (undated) DEVICE — SUTURE VICRYL + SZ 0 L27IN ABSRB UD CT-1 L36MM 1/2 CIR TAPR VCP260H

## (undated) DEVICE — WAX SURG 2.5GM HEMSTAT BNE BEESWAX PARAFFIN ISO PALMITATE

## (undated) DEVICE — SUTURE ABSORBABLE BRAIDED 2-0 CT-1 27 IN UD VICRYL J259H

## (undated) DEVICE — PAD GEN USE BORDERED ADH 14IN 2IN AND 12IN 4IN GZ UNIV ST